# Patient Record
Sex: FEMALE
[De-identification: names, ages, dates, MRNs, and addresses within clinical notes are randomized per-mention and may not be internally consistent; named-entity substitution may affect disease eponyms.]

---

## 2022-07-01 ENCOUNTER — APPOINTMENT (OUTPATIENT)
Dept: OBGYN | Facility: CLINIC | Age: 22
End: 2022-07-01

## 2024-06-26 ENCOUNTER — APPOINTMENT (OUTPATIENT)
Dept: OBGYN | Facility: CLINIC | Age: 24
End: 2024-06-26
Payer: COMMERCIAL

## 2024-06-26 VITALS
SYSTOLIC BLOOD PRESSURE: 113 MMHG | BODY MASS INDEX: 23.95 KG/M2 | HEART RATE: 86 BPM | DIASTOLIC BLOOD PRESSURE: 55 MMHG | OXYGEN SATURATION: 98 % | HEIGHT: 66 IN | WEIGHT: 149 LBS

## 2024-06-26 DIAGNOSIS — N94.12 DEEP DYSPAREUNIA: ICD-10-CM

## 2024-06-26 DIAGNOSIS — Z78.9 OTHER SPECIFIED HEALTH STATUS: ICD-10-CM

## 2024-06-26 DIAGNOSIS — N92.1 EXCESSIVE AND FREQUENT MENSTRUATION WITH IRREGULAR CYCLE: ICD-10-CM

## 2024-06-26 PROBLEM — Z00.00 ENCOUNTER FOR PREVENTIVE HEALTH EXAMINATION: Status: ACTIVE | Noted: 2024-06-26

## 2024-06-26 PROCEDURE — 99203 OFFICE O/P NEW LOW 30 MIN: CPT

## 2024-06-26 RX ORDER — DROSPIRENONE AND ESTETROL 3-14.2(28)
3-14.2 KIT ORAL
Refills: 0 | Status: DISCONTINUED | COMMUNITY
End: 2024-06-26

## 2024-06-26 RX ORDER — NORETHINDRONE ACETATE AND ETHINYL ESTRADIOL AND FERROUS FUMARATE 1MG-20(21)
1-20 KIT ORAL
Qty: 3 | Refills: 3 | Status: ACTIVE | COMMUNITY
Start: 2024-06-26 | End: 1900-01-01

## 2024-08-07 ENCOUNTER — OUTPATIENT (OUTPATIENT)
Dept: OUTPATIENT SERVICES | Facility: HOSPITAL | Age: 24
LOS: 1 days | End: 2024-08-07

## 2024-08-07 ENCOUNTER — APPOINTMENT (OUTPATIENT)
Dept: ULTRASOUND IMAGING | Facility: HOSPITAL | Age: 24
End: 2024-08-07

## 2024-08-07 PROCEDURE — 76830 TRANSVAGINAL US NON-OB: CPT

## 2024-08-07 PROCEDURE — 76856 US EXAM PELVIC COMPLETE: CPT | Mod: 26

## 2024-08-07 PROCEDURE — 76856 US EXAM PELVIC COMPLETE: CPT

## 2024-08-07 PROCEDURE — 76830 TRANSVAGINAL US NON-OB: CPT | Mod: 26

## 2024-08-08 ENCOUNTER — TRANSCRIPTION ENCOUNTER (OUTPATIENT)
Age: 24
End: 2024-08-08

## 2024-08-26 ENCOUNTER — NON-APPOINTMENT (OUTPATIENT)
Age: 24
End: 2024-08-26

## 2024-08-27 ENCOUNTER — APPOINTMENT (OUTPATIENT)
Dept: OBGYN | Facility: CLINIC | Age: 24
End: 2024-08-27
Payer: COMMERCIAL

## 2024-08-27 VITALS
SYSTOLIC BLOOD PRESSURE: 108 MMHG | DIASTOLIC BLOOD PRESSURE: 72 MMHG | WEIGHT: 146 LBS | OXYGEN SATURATION: 96 % | HEART RATE: 80 BPM

## 2024-08-27 DIAGNOSIS — N94.12 DEEP DYSPAREUNIA: ICD-10-CM

## 2024-08-27 DIAGNOSIS — N92.1 EXCESSIVE AND FREQUENT MENSTRUATION WITH IRREGULAR CYCLE: ICD-10-CM

## 2024-08-27 PROCEDURE — 99213 OFFICE O/P EST LOW 20 MIN: CPT

## 2024-08-27 NOTE — HISTORY OF PRESENT ILLNESS
[FreeTextEntry1] : Kaylee Arrington 23yo female presents here for a follow up on birth control pills. She reports her menstrual cycles have improved on OCPs and she denies unschedule bleeding. Pelvic ultrasound results were reviewed and wnl. She is scheduled for an appt with pelvic floor PT.

## 2025-03-06 RX ORDER — NORETHINDRONE ACETATE AND ETHINYL ESTRADIOL AND FERROUS FUMARATE 1MG-20(21)
1-20 KIT ORAL DAILY
Qty: 84 | Refills: 0 | Status: ACTIVE | COMMUNITY
Start: 2025-03-06 | End: 1900-01-01

## 2025-06-24 ENCOUNTER — RX RENEWAL (OUTPATIENT)
Age: 25
End: 2025-06-24

## 2025-06-24 RX ORDER — NORETHINDRONE ACETATE AND ETHINYL ESTRADIOL 1MG-20(21)
1-20 KIT ORAL
Qty: 84 | Refills: 3 | Status: ACTIVE | COMMUNITY
Start: 2025-06-24 | End: 1900-01-01

## 2025-07-11 ENCOUNTER — APPOINTMENT (OUTPATIENT)
Dept: OBGYN | Facility: CLINIC | Age: 25
End: 2025-07-11
Payer: COMMERCIAL

## 2025-07-11 VITALS
OXYGEN SATURATION: 99 % | SYSTOLIC BLOOD PRESSURE: 115 MMHG | HEART RATE: 82 BPM | WEIGHT: 150 LBS | DIASTOLIC BLOOD PRESSURE: 78 MMHG

## 2025-07-11 PROCEDURE — 99212 OFFICE O/P EST SF 10 MIN: CPT

## 2025-07-11 RX ORDER — CYCLOBENZAPRINE HYDROCHLORIDE 5 MG/1
5 TABLET, FILM COATED ORAL
Qty: 30 | Refills: 0 | Status: ACTIVE | COMMUNITY
Start: 2025-07-11 | End: 1900-01-01

## 2025-07-11 RX ORDER — LIDOCAINE 5 G/100G
5 OINTMENT TOPICAL
Qty: 1 | Refills: 3 | Status: ACTIVE | COMMUNITY
Start: 2025-07-11 | End: 1900-01-01

## 2025-07-21 ENCOUNTER — NON-APPOINTMENT (OUTPATIENT)
Age: 25
End: 2025-07-21

## 2025-07-22 ENCOUNTER — APPOINTMENT (OUTPATIENT)
Dept: PAIN MANAGEMENT | Facility: CLINIC | Age: 25
End: 2025-07-22
Payer: COMMERCIAL

## 2025-07-22 VITALS
WEIGHT: 150 LBS | HEART RATE: 72 BPM | OXYGEN SATURATION: 98 % | HEIGHT: 66 IN | DIASTOLIC BLOOD PRESSURE: 74 MMHG | BODY MASS INDEX: 24.11 KG/M2 | SYSTOLIC BLOOD PRESSURE: 111 MMHG

## 2025-07-22 DIAGNOSIS — Z00.00 ENCOUNTER FOR GENERAL ADULT MEDICAL EXAMINATION W/OUT ABNORMAL FINDINGS: ICD-10-CM

## 2025-07-22 DIAGNOSIS — R10.2 PELVIC AND PERINEAL PAIN: ICD-10-CM

## 2025-07-22 DIAGNOSIS — N94.10 UNSPECIFIED DYSPAREUNIA: ICD-10-CM

## 2025-07-22 PROCEDURE — 99205 OFFICE O/P NEW HI 60 MIN: CPT

## 2025-08-12 ENCOUNTER — APPOINTMENT (OUTPATIENT)
Dept: OBGYN | Facility: CLINIC | Age: 25
End: 2025-08-12

## 2025-09-02 ENCOUNTER — APPOINTMENT (OUTPATIENT)
Dept: OBGYN | Facility: CLINIC | Age: 25
End: 2025-09-02
Payer: COMMERCIAL

## 2025-09-02 ENCOUNTER — NON-APPOINTMENT (OUTPATIENT)
Age: 25
End: 2025-09-02

## 2025-09-02 VITALS
WEIGHT: 150 LBS | DIASTOLIC BLOOD PRESSURE: 76 MMHG | HEART RATE: 71 BPM | BODY MASS INDEX: 24.11 KG/M2 | OXYGEN SATURATION: 98 % | HEIGHT: 66 IN | SYSTOLIC BLOOD PRESSURE: 115 MMHG

## 2025-09-02 DIAGNOSIS — N94.10 UNSPECIFIED DYSPAREUNIA: ICD-10-CM

## 2025-09-02 PROCEDURE — 99459 PELVIC EXAMINATION: CPT

## 2025-09-02 PROCEDURE — 99203 OFFICE O/P NEW LOW 30 MIN: CPT

## 2025-09-09 ENCOUNTER — TRANSCRIPTION ENCOUNTER (OUTPATIENT)
Age: 25
End: 2025-09-09

## 2025-09-09 DIAGNOSIS — N76.0 ACUTE VAGINITIS: ICD-10-CM

## 2025-09-09 LAB
MYCOPLASMA HOMINIS CULTURE: NEGATIVE
UREAPLASMA CULTURE: POSITIVE

## 2025-09-09 RX ORDER — DOXYCYCLINE HYCLATE 100 MG/1
100 TABLET ORAL TWICE DAILY
Qty: 14 | Refills: 0 | Status: ACTIVE | COMMUNITY
Start: 2025-09-09 | End: 1900-01-01

## 2025-09-12 ENCOUNTER — TRANSCRIPTION ENCOUNTER (OUTPATIENT)
Age: 25
End: 2025-09-12

## 2025-09-16 ENCOUNTER — APPOINTMENT (OUTPATIENT)
Dept: PAIN MANAGEMENT | Facility: CLINIC | Age: 25
End: 2025-09-16